# Patient Record
Sex: FEMALE | Race: WHITE | Employment: FULL TIME | URBAN - METROPOLITAN AREA
[De-identification: names, ages, dates, MRNs, and addresses within clinical notes are randomized per-mention and may not be internally consistent; named-entity substitution may affect disease eponyms.]

---

## 2018-05-15 ENCOUNTER — HOSPITAL ENCOUNTER (OUTPATIENT)
Dept: ULTRASOUND IMAGING | Age: 23
Discharge: HOME | End: 2018-05-15
Attending: FAMILY MEDICINE
Payer: COMMERCIAL

## 2018-05-15 ENCOUNTER — HOSPITAL ENCOUNTER (OUTPATIENT)
Dept: GENERAL RADIOLOGY | Age: 23
Discharge: HOME | End: 2018-05-15
Attending: FAMILY MEDICINE
Payer: COMMERCIAL

## 2018-05-15 DIAGNOSIS — M25.472 LEFT ANKLE SWELLING: ICD-10-CM

## 2018-05-15 PROCEDURE — 93971 EXTREMITY STUDY: CPT

## 2018-05-15 PROCEDURE — 73610 X-RAY EXAM OF ANKLE: CPT

## 2018-05-15 PROCEDURE — 73630 X-RAY EXAM OF FOOT: CPT

## 2018-05-16 NOTE — PROGRESS NOTES
Left results on cell VM per hippa. Also gave her the foot and ankle x-ray results. Call if swelling doesn't resolve.